# Patient Record
Sex: FEMALE | Race: BLACK OR AFRICAN AMERICAN | NOT HISPANIC OR LATINO | URBAN - METROPOLITAN AREA
[De-identification: names, ages, dates, MRNs, and addresses within clinical notes are randomized per-mention and may not be internally consistent; named-entity substitution may affect disease eponyms.]

---

## 2021-11-08 NOTE — PATIENT DISCUSSION
Prescribed Doxycycline 100mg PO BID x 2 weeks and Tobradex TATY QHS x 2 weeks. Then restart Loteprednol BID until out.

## 2021-11-16 ENCOUNTER — ESTABLISHED COMPREHENSIVE EXAM (OUTPATIENT)
Dept: URBAN - METROPOLITAN AREA CLINIC 7 | Facility: CLINIC | Age: 56
End: 2021-11-16

## 2021-11-16 DIAGNOSIS — H40.053: ICD-10-CM

## 2021-11-16 DIAGNOSIS — H52.4: ICD-10-CM

## 2021-11-16 PROCEDURE — 92250 FUNDUS PHOTOGRAPHY W/I&R: CPT

## 2021-11-16 PROCEDURE — 92015 DETERMINE REFRACTIVE STATE: CPT

## 2021-11-16 PROCEDURE — 92014 COMPRE OPH EXAM EST PT 1/>: CPT

## 2021-11-16 ASSESSMENT — KERATOMETRY
OS_K2POWER_DIOPTERS: 44.25
OD_K2POWER_DIOPTERS: 44.75
OS_K1POWER_DIOPTERS: 43.75
OD_AXISANGLE2_DEGREES: 171
OD_K1POWER_DIOPTERS: 44.25
OD_AXISANGLE_DEGREES: 081
OS_AXISANGLE2_DEGREES: 26
OS_AXISANGLE_DEGREES: 116

## 2021-11-16 ASSESSMENT — VISUAL ACUITY
OU_SC: 20/30
OD_SC: 20/30
OS_SC: 20/30

## 2021-11-16 ASSESSMENT — TONOMETRY
OD_IOP_MMHG: 22
OS_IOP_MMHG: 22

## 2022-05-17 ENCOUNTER — ESTABLISHED PATIENT (OUTPATIENT)
Dept: URBAN - METROPOLITAN AREA CLINIC 7 | Facility: CLINIC | Age: 57
End: 2022-05-17

## 2022-05-17 DIAGNOSIS — H40.053: ICD-10-CM

## 2022-05-17 PROCEDURE — 99213 OFFICE O/P EST LOW 20 MIN: CPT

## 2022-05-17 PROCEDURE — 92133 CPTRZD OPH DX IMG PST SGM ON: CPT

## 2022-05-17 PROCEDURE — 92083 EXTENDED VISUAL FIELD XM: CPT

## 2022-05-17 ASSESSMENT — KERATOMETRY
OS_K1POWER_DIOPTERS: 43.75
OD_AXISANGLE_DEGREES: 081
OD_K1POWER_DIOPTERS: 44.25
OD_K2POWER_DIOPTERS: 44.75
OD_AXISANGLE2_DEGREES: 171
OS_AXISANGLE2_DEGREES: 26
OS_AXISANGLE_DEGREES: 116
OS_K2POWER_DIOPTERS: 44.25

## 2022-05-17 ASSESSMENT — TONOMETRY
OS_IOP_MMHG: 26
OD_IOP_MMHG: 24

## 2022-07-07 RX ORDER — METFORMIN HYDROCHLORIDE 500 MG/1
TABLET, EXTENDED RELEASE ORAL
COMMUNITY

## 2022-07-07 RX ORDER — GLIPIZIDE 10 MG/1
TABLET ORAL
COMMUNITY

## 2022-07-07 RX ORDER — CANAGLIFLOZIN 300 MG/1
TABLET, FILM COATED ORAL
COMMUNITY

## 2022-07-07 RX ORDER — AMLODIPINE BESYLATE 2.5 MG/1
TABLET ORAL
COMMUNITY

## 2022-11-22 ENCOUNTER — ESTABLISHED PATIENT (OUTPATIENT)
Facility: LOCATION | Age: 57
End: 2022-11-22

## 2022-11-22 DIAGNOSIS — H04.123: ICD-10-CM

## 2022-11-22 DIAGNOSIS — H40.053: ICD-10-CM

## 2022-11-22 DIAGNOSIS — E11.9: ICD-10-CM

## 2022-11-22 DIAGNOSIS — H52.4: ICD-10-CM

## 2022-11-22 DIAGNOSIS — H31.002: ICD-10-CM

## 2022-11-22 PROCEDURE — 92015 DETERMINE REFRACTIVE STATE: CPT

## 2022-11-22 PROCEDURE — 92250 FUNDUS PHOTOGRAPHY W/I&R: CPT

## 2022-11-22 PROCEDURE — 92014 COMPRE OPH EXAM EST PT 1/>: CPT

## 2022-11-22 ASSESSMENT — TONOMETRY
OD_IOP_MMHG: 19
OS_IOP_MMHG: 20

## 2022-11-22 ASSESSMENT — VISUAL ACUITY
OS_CC: 20/25-1
OU_CC: 20/20
OD_CC: 20/20-2

## 2022-11-22 ASSESSMENT — KERATOMETRY
OS_AXISANGLE2_DEGREES: 177
OS_K1POWER_DIOPTERS: 43.50
OS_K2POWER_DIOPTERS: 44.00
OD_AXISANGLE_DEGREES: 87
OD_AXISANGLE2_DEGREES: 171
OS_AXISANGLE_DEGREES: 87
OD_AXISANGLE_DEGREES: 081
OS_AXISANGLE2_DEGREES: 26
OD_K1POWER_DIOPTERS: 44.25
OD_K1POWER_DIOPTERS: 44.00
OS_AXISANGLE_DEGREES: 116
OD_AXISANGLE2_DEGREES: 177
OD_K2POWER_DIOPTERS: 44.75
OS_K2POWER_DIOPTERS: 44.25
OS_K1POWER_DIOPTERS: 43.75

## 2023-11-19 ASSESSMENT — KERATOMETRY
OS_K1POWER_DIOPTERS: 43.50
OS_AXISANGLE2_DEGREES: 177
OS_AXISANGLE_DEGREES: 87
OD_AXISANGLE2_DEGREES: 177
OD_AXISANGLE_DEGREES: 87
OD_K1POWER_DIOPTERS: 44.00
OS_K2POWER_DIOPTERS: 44.00
OD_K2POWER_DIOPTERS: 44.75

## 2023-11-21 ENCOUNTER — ESTABLISHED PATIENT (OUTPATIENT)
Facility: LOCATION | Age: 58
End: 2023-11-21

## 2023-11-21 DIAGNOSIS — H52.4: ICD-10-CM

## 2023-11-21 DIAGNOSIS — H31.002: ICD-10-CM

## 2023-11-21 DIAGNOSIS — H40.053: ICD-10-CM

## 2023-11-21 PROCEDURE — 92250 FUNDUS PHOTOGRAPHY W/I&R: CPT

## 2023-11-21 PROCEDURE — 92014 COMPRE OPH EXAM EST PT 1/>: CPT

## 2023-11-21 PROCEDURE — 92015 DETERMINE REFRACTIVE STATE: CPT

## 2023-11-21 ASSESSMENT — VISUAL ACUITY
OU_SC: 20/40
OS_SC: 20/40-1
OD_SC: 20/40

## 2023-11-21 ASSESSMENT — TONOMETRY
OS_IOP_MMHG: 19
OD_IOP_MMHG: 20

## 2024-06-04 ENCOUNTER — TECH ONLY (OUTPATIENT)
Facility: LOCATION | Age: 59
End: 2024-06-04

## 2024-06-04 DIAGNOSIS — H52.4: ICD-10-CM

## 2024-06-04 PROCEDURE — 99211T TECH SERVICE: Mod: NC

## 2024-06-04 ASSESSMENT — KERATOMETRY
OS_AXISANGLE2_DEGREES: 177
OD_AXISANGLE2_DEGREES: 177
OS_K1POWER_DIOPTERS: 43.50
OD_K1POWER_DIOPTERS: 44.00
OS_AXISANGLE_DEGREES: 87
OD_AXISANGLE_DEGREES: 87
OD_K2POWER_DIOPTERS: 44.75
OS_K2POWER_DIOPTERS: 44.00

## 2024-11-26 ENCOUNTER — COMPREHENSIVE EXAM (OUTPATIENT)
Facility: LOCATION | Age: 59
End: 2024-11-26

## 2024-11-26 DIAGNOSIS — H31.002: ICD-10-CM

## 2024-11-26 DIAGNOSIS — H52.4: ICD-10-CM

## 2024-11-26 DIAGNOSIS — H40.053: ICD-10-CM

## 2024-11-26 PROCEDURE — 92250 FUNDUS PHOTOGRAPHY W/I&R: CPT

## 2024-11-26 PROCEDURE — 92015 DETERMINE REFRACTIVE STATE: CPT

## 2024-11-26 PROCEDURE — 92014 COMPRE OPH EXAM EST PT 1/>: CPT
